# Patient Record
Sex: FEMALE | Race: WHITE | URBAN - METROPOLITAN AREA
[De-identification: names, ages, dates, MRNs, and addresses within clinical notes are randomized per-mention and may not be internally consistent; named-entity substitution may affect disease eponyms.]

---

## 2020-05-21 ENCOUNTER — APPOINTMENT (RX ONLY)
Dept: URBAN - METROPOLITAN AREA CLINIC 15 | Facility: CLINIC | Age: 25
Setting detail: DERMATOLOGY
End: 2020-05-21

## 2020-05-21 DIAGNOSIS — Z41.9 ENCOUNTER FOR PROCEDURE FOR PURPOSES OTHER THAN REMEDYING HEALTH STATE, UNSPECIFIED: ICD-10-CM

## 2020-05-21 PROCEDURE — ? ADDITIONAL NOTES

## 2020-05-21 PROCEDURE — ? BOTOX

## 2020-05-21 ASSESSMENT — LOCATION DETAILED DESCRIPTION DERM
LOCATION DETAILED: LEFT CHIN
LOCATION DETAILED: RIGHT CHIN

## 2020-05-21 ASSESSMENT — LOCATION ZONE DERM: LOCATION ZONE: FACE

## 2020-05-21 ASSESSMENT — LOCATION SIMPLE DESCRIPTION DERM: LOCATION SIMPLE: CHIN

## 2020-05-21 NOTE — PROCEDURE: BOTOX
Expiration Date (Month Year): 08/2022
R Brow Units: 0
Show Right And Left Brow Units: No
Additional Area 1 Location: chin
Show Additional Area 5: Yes
Lot #: [id number]
Consent: Written consent obtained. Risks include but not limited to lid/brow ptosis, bruising, swelling, diplopia, temporary effect, incomplete chemical denervation.
Detail Level: Zone
Post-Care Instructions: Patient instructed to not lie down for 4 hours and limit physical activity for 24 hours. Patient instructed not to travel by airplane for 48 hours.
Additional Area 1 Units: 10
Dilution (U/0.1 Cc): 4

## 2020-07-02 ENCOUNTER — APPOINTMENT (RX ONLY)
Dept: URBAN - METROPOLITAN AREA CLINIC 15 | Facility: CLINIC | Age: 25
Setting detail: DERMATOLOGY
End: 2020-07-02

## 2020-07-02 DIAGNOSIS — Z41.9 ENCOUNTER FOR PROCEDURE FOR PURPOSES OTHER THAN REMEDYING HEALTH STATE, UNSPECIFIED: ICD-10-CM

## 2020-07-02 PROCEDURE — ? MEDICAL CONSULTATION: CHEMICAL PEELS

## 2020-07-02 PROCEDURE — ? MEDICAL CONSULTATION: FILLERS

## 2020-07-02 PROCEDURE — ? FILLERS

## 2020-07-02 PROCEDURE — ? ADDITIONAL NOTES

## 2020-07-02 ASSESSMENT — LOCATION ZONE DERM
LOCATION ZONE: NOSE
LOCATION ZONE: FACE

## 2020-07-02 ASSESSMENT — LOCATION SIMPLE DESCRIPTION DERM
LOCATION SIMPLE: CHIN
LOCATION SIMPLE: NOSE

## 2020-07-02 ASSESSMENT — LOCATION DETAILED DESCRIPTION DERM
LOCATION DETAILED: RIGHT CHIN
LOCATION DETAILED: NASAL DORSUM
LOCATION DETAILED: NASAL ROOT

## 2020-07-02 NOTE — PROCEDURE: FILLERS
Tem[place] Hollows Filler  Volume In Cc: 0
Include Cannula Size?: 25G
Detail Level: Detailed
Include Cannula Information In Note?: No
Include Cannula Length?: 1.5 inch
Price (Use Numbers Only, No Special Characters Or $): [address]
Lot #: [id number]
Expiration Date (Month Year): 2022-03-02
Use Map Statement For Sites (Optional): Yes
Additional Area 1 Location: lips
Anesthesia Lot #: [id number]
Anesthesia Expiration Date (Month Year): 10/2020
Lot #: [id number]
Additional Area 2 Location: glabella
Consent: Written consent obtained. Risks include but not limited to bruising, beading, irregular texture, ulceration, infection, allergic reaction, scar formation, incomplete augmentation, temporary nature, procedural pain.
Filler: Voluma
Map Statment: See [address] for Complete Details
Post-Care Instructions: Patient instructed to apply ice to reduce swelling and return with any issues.
Expiration Date (Month Year): 2021-09-27
Additional Area 3 Location: nose
Additional Area 3 Volume In Cc: 0.5
Additional Area 4 Location: chin
Additional Area 2 Location: Left Nasolabial folds
Additional Area 1 Location: lip
Lot #: [id number]
Anesthesia Type: 1% lidocaine with 1:200,000 epinephrine and 408mcg clindamycin/ml
Expiration Date (Month Year): 6/24/2021
Include Cannula Brand?: DermaSculpt

## 2020-07-22 ENCOUNTER — APPOINTMENT (RX ONLY)
Dept: URBAN - METROPOLITAN AREA CLINIC 15 | Facility: CLINIC | Age: 25
Setting detail: DERMATOLOGY
End: 2020-07-22

## 2020-07-22 DIAGNOSIS — Z41.9 ENCOUNTER FOR PROCEDURE FOR PURPOSES OTHER THAN REMEDYING HEALTH STATE, UNSPECIFIED: ICD-10-CM

## 2020-07-22 PROCEDURE — ? ADDITIONAL NOTES

## 2020-07-22 PROCEDURE — ? DYSPORT

## 2020-07-22 ASSESSMENT — LOCATION ZONE DERM: LOCATION ZONE: FACE

## 2020-07-22 ASSESSMENT — LOCATION DETAILED DESCRIPTION DERM
LOCATION DETAILED: LEFT CHIN
LOCATION DETAILED: RIGHT CHIN

## 2020-07-22 ASSESSMENT — LOCATION SIMPLE DESCRIPTION DERM: LOCATION SIMPLE: CHIN

## 2020-07-22 NOTE — PROCEDURE: DYSPORT
Show Orbicularis Oculi Units: Yes
Right Pupillary Line Units: 0
Post-Care Instructions: Patient instructed to not lie down for 4 hours and limit physical activity for 24 hours. Patient instructed not to travel by airplane for 48 hours.
Additional Area 1 Units: 8
Show Ucl Units: No
Expiration Date (Month Year): 11/30/2020
Consent: Written consent obtained. Risks include but not limited to lid/brow ptosis, bruising, swelling, diplopia, temporary effect, incomplete chemical denervation.
Lot #: [id number]
Additional Area 1 Location: chin
Dilution (U/ 0.1cc): 3
Detail Level: Zone

## 2021-06-21 ENCOUNTER — APPOINTMENT (RX ONLY)
Dept: URBAN - METROPOLITAN AREA CLINIC 15 | Facility: CLINIC | Age: 26
Setting detail: DERMATOLOGY
End: 2021-06-21

## 2021-06-21 DIAGNOSIS — Z41.9 ENCOUNTER FOR PROCEDURE FOR PURPOSES OTHER THAN REMEDYING HEALTH STATE, UNSPECIFIED: ICD-10-CM

## 2021-06-21 PROCEDURE — ? ADDITIONAL NOTES

## 2021-06-21 PROCEDURE — ? FILLERS

## 2021-06-21 ASSESSMENT — LOCATION SIMPLE DESCRIPTION DERM
LOCATION SIMPLE: RIGHT LIP
LOCATION SIMPLE: NOSE
LOCATION SIMPLE: CHIN
LOCATION SIMPLE: LEFT LIP

## 2021-06-21 ASSESSMENT — LOCATION DETAILED DESCRIPTION DERM
LOCATION DETAILED: LEFT CHIN
LOCATION DETAILED: LEFT LOWER CUTANEOUS LIP
LOCATION DETAILED: NASAL SUPRATIP
LOCATION DETAILED: NASAL ROOT
LOCATION DETAILED: RIGHT LOWER CUTANEOUS LIP

## 2021-06-21 ASSESSMENT — LOCATION ZONE DERM
LOCATION ZONE: FACE
LOCATION ZONE: LIP
LOCATION ZONE: NOSE

## 2021-06-21 NOTE — PROCEDURE: ADDITIONAL NOTES
Render Risk Assessment In Note?: no
Detail Level: Zone
Additional Notes: [place] and chin \\n1- Voluma:$900\\n\\nChin \\n1- Restylane Defyne:$900

## 2021-06-21 NOTE — PROCEDURE: FILLERS
[place]s Filler  Volume In Cc: 0
Include Cannula Size?: 25G
Include Cannula Information In Note?: No
Expiration Date (Month Year): 2023-02-09
Include Cannula Length?: 1.5 inch
Additional Area 1 Location: lips
Additional Area 2 Location: nose
Additional Area 2 Volume In Cc: 0.3
Additional Area 2 Location: lateral chin
Additional Area 1 Location: corner of the mouth
Additional Area 3 Location: chin
Additional Area 2 Volume In Cc: 1
Filler: Voluma
Additional Area 3 Location: right cheek ( [place])
Filler: Restylane Defyne
Detail Level: Zone
Additional Area 3 Volume In Cc: 0.5
Additional Area 4 Location: hips
Lot #: [id number]
Consent: Written consent obtained. Risks include but not limited to bruising, beading, irregular texture, ulceration, infection, allergic reaction, scar formation, incomplete augmentation, temporary nature, procedural pain.
Expiration Date (Month Year): 2022-09-30
Post-Care Instructions: Patient instructed to apply ice to reduce swelling and return with any issues.
Lot #: [id number]
Use Map Statement For Sites (Optional): Yes
Include Cannula Brand?: DermaSculpt
Expiration Date (Month Year): 2022-05-17
Map Statment: See [address] for Complete Details
Lot #: [id number]

## 2021-09-08 ENCOUNTER — APPOINTMENT (RX ONLY)
Dept: URBAN - METROPOLITAN AREA CLINIC 15 | Facility: CLINIC | Age: 26
Setting detail: DERMATOLOGY
End: 2021-09-08

## 2021-09-08 DIAGNOSIS — Z41.9 ENCOUNTER FOR PROCEDURE FOR PURPOSES OTHER THAN REMEDYING HEALTH STATE, UNSPECIFIED: ICD-10-CM

## 2021-09-08 PROCEDURE — ? DYSPORT

## 2021-09-08 ASSESSMENT — LOCATION DETAILED DESCRIPTION DERM
LOCATION DETAILED: LEFT CHIN
LOCATION DETAILED: RIGHT CHIN

## 2021-09-08 ASSESSMENT — LOCATION SIMPLE DESCRIPTION DERM: LOCATION SIMPLE: CHIN

## 2021-09-08 ASSESSMENT — LOCATION ZONE DERM: LOCATION ZONE: FACE

## 2021-09-08 NOTE — PROCEDURE: DYSPORT
Lcl Root Units: 0
Show Nasal Units: Yes
Detail Level: Zone
Additional Comments: chin 8u x $18 = $144
Lot #: [id number]
Additional Area 1 Location: chin
Expiration Date (Month Year): 2/28/22
Consent: Written consent obtained. Risks include but not limited to lid/brow ptosis, bruising, swelling, diplopia, temporary effect, incomplete chemical denervation.
Show Mentalis Units: No
Additional Area 3 Location: nasal flare
Price (Use Numbers Only, No Special Characters Or $): [address] [facility]
Post-Care Instructions: Patient instructed to not lie down for 4 hours and limit physical activity for 24 hours. Patient instructed not to travel by airplane for 48 hours.
Dilution (U/ 0.1cc): 3
Additional Area 2 Location: neck

## 2022-03-22 ENCOUNTER — APPOINTMENT (RX ONLY)
Dept: URBAN - METROPOLITAN AREA CLINIC 15 | Facility: CLINIC | Age: 27
Setting detail: DERMATOLOGY
End: 2022-03-22

## 2022-03-22 VITALS — WEIGHT: 107 LBS | HEIGHT: 67 IN

## 2022-03-22 DIAGNOSIS — Z41.9 ENCOUNTER FOR PROCEDURE FOR PURPOSES OTHER THAN REMEDYING HEALTH STATE, UNSPECIFIED: ICD-10-CM

## 2022-03-22 PROCEDURE — ? DYSPORT

## 2022-03-22 ASSESSMENT — LOCATION SIMPLE DESCRIPTION DERM: LOCATION SIMPLE: CHIN

## 2022-03-22 ASSESSMENT — LOCATION DETAILED DESCRIPTION DERM: LOCATION DETAILED: RIGHT CHIN

## 2022-03-22 ASSESSMENT — LOCATION ZONE DERM: LOCATION ZONE: FACE

## 2022-03-22 NOTE — PROCEDURE: DYSPORT
Right Periorbital Units: 0
Show Periorbital Units: Yes
Show Right And Left Pupillary Line Units: No
Expiration Date (Month Year): 11/30/2022
Price (Use Numbers Only, No Special Characters Or $): [address] [facility]
Show Inventory Tab: Hide
Additional Area 3 Location: nasal flare
Additional Comments: Box 17/1
Consent: Written consent obtained. Risks include but not limited to lid/brow ptosis, bruising, swelling, diplopia, temporary effect, incomplete chemical denervation.
Lot #: [id number]
Detail Level: Zone
Additional Area 1 Location: chin
Post-Care Instructions: Patient instructed to not lie down for 4 hours and limit physical activity for 24 hours. Patient instructed not to travel by airplane for 48 hours.
Dilution (U/ 0.1cc): 3
Additional Area 1 Units: 8
Additional Area 2 Location: neck
Reconstitution Date (Optional): 8/22/22

## 2022-03-22 NOTE — HPI: COSMETIC (DYSPORT)
Have You Had Dysport Before?: has had dysport
Additional History: Patient is in office to treat chin with Dysport.

## 2022-05-10 ENCOUNTER — APPOINTMENT (RX ONLY)
Dept: URBAN - METROPOLITAN AREA CLINIC 15 | Facility: CLINIC | Age: 27
Setting detail: DERMATOLOGY
End: 2022-05-10

## 2022-05-10 VITALS — WEIGHT: 107 LBS | HEIGHT: 67 IN

## 2022-05-10 DIAGNOSIS — Z41.9 ENCOUNTER FOR PROCEDURE FOR PURPOSES OTHER THAN REMEDYING HEALTH STATE, UNSPECIFIED: ICD-10-CM

## 2022-05-10 PROCEDURE — ? FILLERS

## 2022-05-10 ASSESSMENT — LOCATION DETAILED DESCRIPTION DERM
LOCATION DETAILED: GLABELLA
LOCATION DETAILED: RIGHT LOWER CUTANEOUS LIP
LOCATION DETAILED: RIGHT CHIN
LOCATION DETAILED: RIGHT INFERIOR MEDIAL BUCCAL CHEEK
LOCATION DETAILED: COLUMELLA
LOCATION DETAILED: LEFT INFERIOR CENTRAL BUCCAL CHEEK
LOCATION DETAILED: NASAL DORSUM
LOCATION DETAILED: LEFT LOWER CUTANEOUS LIP

## 2022-05-10 ASSESSMENT — LOCATION SIMPLE DESCRIPTION DERM
LOCATION SIMPLE: LEFT LIP
LOCATION SIMPLE: RIGHT CHEEK
LOCATION SIMPLE: RIGHT LIP
LOCATION SIMPLE: CHIN
LOCATION SIMPLE: GLABELLA
LOCATION SIMPLE: LEFT CHEEK
LOCATION SIMPLE: NOSE

## 2022-05-10 ASSESSMENT — LOCATION ZONE DERM
LOCATION ZONE: NOSE
LOCATION ZONE: LIP
LOCATION ZONE: FACE

## 2022-05-10 NOTE — HPI: COSMETIC (FILLERS)
Have You Had Fillers Before?: has had fillers
Additional History: Patient is in office to treat nose and chin with fillers.

## 2022-05-10 NOTE — PROCEDURE: FILLERS
Detail Level: Zone
Decollete Filler  Volume In Cc: 0
Additional Area 2 Volume In Cc: 0.2
Additional Area 3 Location: chin
Filler: Juvederm Voluma XC
Additional Area 4 Location: corner of the mouth
Additional Area 1 Location: lower face
Price (Use Numbers Only, No Special Characters Or $): [employer] [employer]
Consent: Written consent obtained. Risks include but not limited to bruising, beading, irregular texture, ulceration, infection, allergic reaction, scar formation, incomplete augmentation, temporary nature, procedural pain.
Lot #: [id number]
Use Map Statement For Sites (Optional): Yes
Post-Care Instructions: Patient instructed to apply ice to reduce swelling and return with any issues.
Lot #: [id number]
Expiration Date (Month Year): 2023-02-09
Map Statment: See [address] for Complete Details
Additional Area 4 Location: nose
Include Cannula Information In Note?: No
Expiration Date (Month Year): 2023-05-14
Lot #: [id number]
Include Cannula Brand?: DermaSculpt
Expiration Date (Month Year): 2024-03-15
Additional Area 1 Location: lips
Include Cannula Size?: 25G
Additional Area 1 Volume In Cc: 0.8
Price (Use Numbers Only, No Special Characters Or $): [address]
Filler: Restylane-L
Lot #: [id number]
Map Statment: See Attach Map for Complete Details
Lot #: [id number]
[place] Filler  Volume In Cc: 1
Anesthesia Type: 1% lidocaine with 1:300,000 epinephrine and a 1:10 solution of 8.4% sodium bicarbonate
Lot #: [id number]
Anesthesia Volume In Cc: 0.3
Expiration Date (Month Year): 2024-03-31
Topical Anesthesia?: BLT cream (benzocaine 10%, lidocaine 4%, tetracaine 2%)

## 2022-06-13 ENCOUNTER — APPOINTMENT (RX ONLY)
Dept: URBAN - METROPOLITAN AREA CLINIC 15 | Facility: CLINIC | Age: 27
Setting detail: DERMATOLOGY
End: 2022-06-13

## 2022-09-14 ENCOUNTER — APPOINTMENT (RX ONLY)
Dept: URBAN - METROPOLITAN AREA CLINIC 15 | Facility: CLINIC | Age: 27
Setting detail: DERMATOLOGY
End: 2022-09-14

## 2022-09-14 VITALS — WEIGHT: 107 LBS | HEIGHT: 67 IN

## 2022-09-14 DIAGNOSIS — Z41.9 ENCOUNTER FOR PROCEDURE FOR PURPOSES OTHER THAN REMEDYING HEALTH STATE, UNSPECIFIED: ICD-10-CM

## 2022-09-14 PROCEDURE — ? ADDITIONAL NOTES

## 2022-09-14 PROCEDURE — ? BOTOX

## 2022-09-14 ASSESSMENT — LOCATION ZONE DERM: LOCATION ZONE: FACE

## 2022-09-14 ASSESSMENT — LOCATION DETAILED DESCRIPTION DERM
LOCATION DETAILED: LEFT INFERIOR MEDIAL BUCCAL CHEEK
LOCATION DETAILED: LEFT CHIN
LOCATION DETAILED: RIGHT CHIN

## 2022-09-14 ASSESSMENT — LOCATION SIMPLE DESCRIPTION DERM
LOCATION SIMPLE: LEFT CHEEK
LOCATION SIMPLE: CHIN

## 2022-09-14 NOTE — PROCEDURE: ADDITIONAL NOTES
Detail Level: Zone
Render Risk Assessment In Note?: no
Additional Notes: .\\n\\n- Patient quoted $18 per unit x 5units = $90

## 2022-09-14 NOTE — HPI: COSMETIC (BOTOX)
Have You Had Botox Before?: has had botox
Additional History: Patient is in office for Botox on chin area quoted $18 per unit.

## 2022-09-14 NOTE — PROCEDURE: BOTOX
Show Glabellar Units: Yes
Expiration Date (Month Year): 2024/05
Mentalis Units: 0
Additional Area 5 Location: upper lip
Show Inventory Tab: Show
Lot #: [id number]
Show Right And Left Brow Units: No
Post-Care Instructions: Patient instructed to not lie down for 4 hours and limit physical activity for 24 hours. Patient instructed not to travel by airplane for 48 hours.
Additional Area 2 Location: Chin
Consent: Written consent obtained. Risks include but not limited to lid/brow ptosis, bruising, swelling, diplopia, temporary effect, incomplete chemical denervation.
Detail Level: Simple
Comments: units F/6 10 units
Additional Area 1 Location: [facility]
Dilution (U/0.1 Cc): 2
Additional Area 2 Units: 5